# Patient Record
Sex: MALE | Race: WHITE | NOT HISPANIC OR LATINO | URBAN - METROPOLITAN AREA
[De-identification: names, ages, dates, MRNs, and addresses within clinical notes are randomized per-mention and may not be internally consistent; named-entity substitution may affect disease eponyms.]

---

## 2018-04-30 ENCOUNTER — OFFICE VISIT - HEALTHEAST (OUTPATIENT)
Dept: FAMILY MEDICINE | Facility: CLINIC | Age: 23
End: 2018-04-30

## 2018-04-30 DIAGNOSIS — H66.92 LEFT OTITIS MEDIA: ICD-10-CM

## 2018-04-30 DIAGNOSIS — J01.90 ACUTE SINUSITIS: ICD-10-CM

## 2021-06-01 VITALS — WEIGHT: 175 LBS

## 2021-06-17 NOTE — PROGRESS NOTES
Subjective:   Crow Vasquez is a 23 y.o. male  Roomed by: Arely SHANA>     Refills needed? No    Do you have any forms that need to be filled out? No      Chief Complaint   Patient presents with     Ear Pain     poss Lt. ear infection, Sx. last night   Says had a cold for the last 3 weeks. Says had some decreased hearing with some ear pain. Left ear pain became worse last night. Has felt his left ear pop. Admits feeling left ear pop. Denies any CP or SOB. Admits nasal drainage and congestion. 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 Denies any recent chills, fevers, sore throat or headache.  Lives in the [facility] and is here temporarily.   PMH - denies any chronic medical conditions  PSH - negative  FX - HTN - dad, DM - neg, CAD - paternal uncles, Cancer - mom - lung, Bleeding disorders - neg  SX - see HPI  Review of Systems  See HPI for positives/negatives; Balance of 10 point ROS negative     No Known Allergies  No current outpatient prescriptions on file.  There is no problem list on file for this patient.    Medical History Reviewed  Objective:     Vitals:    04/30/18 1449   BP: 126/78   Patient Site: Right Arm   Patient Position: Sitting   Cuff Size: Adult Regular   Pulse: 61   Resp: 20   Temp: 98.4  F (36.9  C)   TempSrc: Oral   SpO2: 99%   Weight: 175 lb (79.4 kg)   Gen - Pt in NAD  Eyes - Conjunctiva non injected, no drainage  Face - non TTP over frontal sinus areas; non TTP over maxillary areas  Ears - external canals - no induration, Right TM - not injected, Left TM - mildly injected   Nose - mildly congested, clear nasal drainage  Pharynx - non injected, tonsils 1+ size  Neck - supple, no cervical adenopathy, no masses  Cor - RRR w/o murmur  Lungs - Good air entry; no wheezes or crackles noted on auscultation - no coughing noted  Skin - no lesions, no rashes noted    Assessment - Plan   Medical Decision Making -23-year-old male presents with 3 weeks of nasal congestion, without any chest pain or  shortness of breath.  Also admits left ear pain.  On exam he does have a injected left TM indicating an acute otitis media.  His presentation and exam are consistent with an acute sinusitis.  We will treat both with amoxicillin-clavulanate.  See patient instructions.    1. Acute sinusitis  - amoxicillin-clavulanate (AUGMENTIN) 875-125 mg per tablet; Take 1 tablet by mouth 2 (two) times a day for 7 days.  Dispense: 14 tablet; Refill: 0  - Nursing communication    2. Left otitis media  - amoxicillin-clavulanate (AUGMENTIN) 875-125 mg per tablet; Take 1 tablet by mouth 2 (two) times a day for 7 days.  Dispense: 14 tablet; Refill: 0    At the conclusion of the encounter, assessment and plan were discussed.   All questions were answered.   The patient or guardian acknowledged understanding and was involved in the decision making regarding the overall care plan.    Patient Instructions     1. Keep well hydrated  2. May alternate Tylenol every 6 hours with ibuprofen every 6 hours as needed for fever or pain  3. If symptoms not improved after completing antibiotics, follow up with one of the primary care providers  4. If you have any questions, call the clinic number - it's answered 24/7    Sinus infection  Acute bacterial rhinosinusitis (ABRS) is an infection of your nasal cavity and sinuses. It s caused by bacteria. Acute means that you ve had symptoms for less than 12 weeks.  Understanding your sinuses  The nasal cavity is the large air-filled space behind your nose. The sinuses are a group of spaces formed by the bones of your face. They connect with your nasal cavity. ABRS causes the tissue lining these spaces to become inflamed. Mucus may not drain normally. This leads to facial pain and other symptoms.  What causes ABRS?  ABRS most often follows an upper respiratory infection caused by a virus. Bacteria then infect the lining of your nasal cavity and sinuses. But you can also get ABRS if you have:    Nasal  allergies    Long-term nasal swelling and congestion not caused by allergies    Blockage in the nose  Symptoms of ABRS  The symptoms of ABRS may be different for each person, and can include:    Nasal congestion    Runny nose    Fluid draining from the nose down the throat (postnasal drip)    Headache    Cough    Pain in the sinuses    Thick, colored fluid from the nose (mucus)    Fever  Diagnosing ABRS  ABRS may be diagnosed if you ve had an upper respiratory infection like a cold and cough for longer than 10 to 14 days. Your health care provider will ask about your symptoms and your medical history. The provider will check your vital signs, including your temperature. You ll have a physical exam. The health care provider will check your ears, nose, and throat. You likely won t need any tests. If ABRS comes back, you may have a culture or other tests.  Treatment for ABRS  Treatment may include:    Antibiotic medicine. This is for symptoms that last for at least 10 to 14 days.    Nasal corticosteroid medicine. Drops or spray used in the nose can lessen swelling and congestion.    Over-the-counter pain medicine. This is to lessen sinus pain and pressure.    Nasal decongestant medicine. Spray or drops may help to lessen congestion. Do not use them for more than a few days.    Salt wash (saline irrigation). This can help to loosen mucus.  Possible complications of ABRS  ABRS may come back or become long-term (chronic).  In rare cases, ABRS may cause complications such as:     Inflamed tissue around the brain and spinal cord (meningitis)    Inflamed tissue around the eyes (orbital cellulitis)    Inflamed bones around the sinuses (osteitis)  These problems may need to be treated in a hospital with intravenous (IV) antibiotic medicine or surgery.  When to call the health care provider  Call your health care provider if you have any of the following:    Symptoms that don t get better, or get worse    Symptoms that don t  get better after 3 to 5 days on antibiotics    Trouble seeing    Swelling around your eyes    Confusion or trouble staying awake   Date Last Reviewed: 3/3/2015    9915-5055 [employer]. [address] [place] [address], [place] [address]. All rights reserved. This information is not intended as a substitute for professional medical care. Always follow your healthcare professional's instructions.      Patient education: Ear infections (otitis media) (The Basics)    What is an ear infection? -- An ear infection is a condition that can cause pain in the ear, fever, and trouble hearing. Ear infections are common in children.  Ear infections often occur in children after they get a cold. Fluid can build up in the middle part of the ear behind the eardrum. This fluid can become infected and press on the eardrum, causing it to bulge. This causes symptoms.  In some children, some fluid can stay in the ear for weeks to months after the pain and infection have gone away. This fluid can cause hearing loss that is usually mild and temporary. If the hearing loss lasts a long time, it can sometimes lead to problems with language and speech, especially in children who are at risk for problems with language or learning.  What are the symptoms of an ear infection? -- In infants and young children, the symptoms include:  ?Fever  ?Pulling on the ear  ?Being more fussy or less active than usual  ?Having no appetite and not eating as much  ?Vomiting or diarrhea  In older children, symptoms often include ear pain or temporary hearing loss.  How do I know if my child has an ear infection? -- If you think your child has an ear infection, see a doctor or nurse. The doctor or nurse should be able to tell if your child has an ear infection. He or she will ask about symptoms, do an exam, and look in your child's ears.  Is there anything I can do on my own to help my child feel better? -- Yes. You can give your child medicine, such as  acetaminophen (sample brand name: Tylenol) or ibuprofen (sample brand names: Advil, Motrin) to reduce the pain. But never give aspirin to a child younger than 18 years old. Aspirin can cause a dangerous condition called Reye syndrome.  Most doctors do not recommend treating ear infections with cold and cough medicines. These medicines can have dangerous side effects in young children.  How are ear infections treated? -- Doctors can treat ear infections with antibiotics. These medicines kill the bacteria that cause some ear infections. But doctors do not always prescribe these medicines right away. That's because many ear infections are caused by viruses - not bacteria - and antibiotics do not kill viruses. Plus, many children get over ear infections without antibiotics.  Doctors usually prescribe antibiotics to treat ear infections in infants younger than 2 years old. For children older than 2, doctors sometimes hold off on antibiotics.  Your child's doctor might suggest watching your child's symptoms for 1 or 2 days before trying antibiotics if:  ?Your child is healthy in general  ?The pain and fever are not severe  You and your doctor should discuss whether or not to give your child antibiotics. This will depend on your child's age, health problems, and how many ear infections he or she has had in the past.  When should I follow up with the doctor or nurse? -- You should call the doctor or nurse:  ?After 1 to 2 days, if you are watching your child's symptoms. If the pain and fever have not gotten better, your doctor might prescribe antibiotics.  ?After 2 days, if your child is taking antibiotics and his or her symptoms have not improved or are worse.  You should also see the doctor or nurse a few months after an ear infection if your child is younger than 2 or has language or learning problems. Your doctor or nurse will do an ear exam to make sure the fluid is gone. Your child might also need follow-up testing to  check his or her hearing.  If the fluid in the ear is causing hearing loss and does not go away after several months, your doctor might suggest treatment to help drain the fluid. This involves a surgery in which a doctor places a small tube in the eardrum.  Can I reduce the number of ear infections my child gets? -- Yes. If your child gets a lot of ear infections, ask the doctor what you can do to prevent repeat infections. The doctor might suggest that your child get routine vaccines (that he or she might be missing). The doctor might also talk with you about the risks and benefits of:  ?Giving your child an antibiotic every day during certain months of the year  ?Doing surgery to place a small tube in your child's eardrum